# Patient Record
Sex: MALE | Race: BLACK OR AFRICAN AMERICAN | Employment: FULL TIME | URBAN - METROPOLITAN AREA
[De-identification: names, ages, dates, MRNs, and addresses within clinical notes are randomized per-mention and may not be internally consistent; named-entity substitution may affect disease eponyms.]

---

## 2018-06-06 ENCOUNTER — HOSPITAL ENCOUNTER (EMERGENCY)
Facility: CLINIC | Age: 58
Discharge: HOME | End: 2018-06-06
Attending: FAMILY MEDICINE | Admitting: FAMILY MEDICINE
Payer: COMMERCIAL

## 2018-06-06 ENCOUNTER — APPOINTMENT (OUTPATIENT)
Dept: GENERAL RADIOLOGY | Facility: CLINIC | Age: 58
End: 2018-06-06
Attending: FAMILY MEDICINE
Payer: COMMERCIAL

## 2018-06-06 VITALS
TEMPERATURE: 98.2 F | OXYGEN SATURATION: 100 % | SYSTOLIC BLOOD PRESSURE: 131 MMHG | HEART RATE: 51 BPM | DIASTOLIC BLOOD PRESSURE: 75 MMHG | RESPIRATION RATE: 13 BRPM

## 2018-06-06 DIAGNOSIS — R07.89 OTHER CHEST PAIN: ICD-10-CM

## 2018-06-06 DIAGNOSIS — E75.21: ICD-10-CM

## 2018-06-06 DIAGNOSIS — G89.29 CHRONIC CHEST PAIN: ICD-10-CM

## 2018-06-06 DIAGNOSIS — G89.29 OTHER CHRONIC PAIN: ICD-10-CM

## 2018-06-06 DIAGNOSIS — I42.2 HYPERTROPHIC CARDIOMYOPATHY (H): ICD-10-CM

## 2018-06-06 DIAGNOSIS — R07.9 CHRONIC CHEST PAIN: ICD-10-CM

## 2018-06-06 LAB
ALBUMIN SERPL-MCNC: 4 G/DL (ref 3.4–5)
ALP SERPL-CCNC: 73 U/L (ref 40–150)
ALT SERPL W P-5'-P-CCNC: 15 U/L (ref 0–70)
ANION GAP SERPL CALCULATED.3IONS-SCNC: 7 MMOL/L (ref 3–14)
AST SERPL W P-5'-P-CCNC: 25 U/L (ref 0–45)
BASOPHILS # BLD AUTO: 0.1 10E9/L (ref 0–0.2)
BASOPHILS NFR BLD AUTO: 0.7 %
BILIRUB SERPL-MCNC: 0.5 MG/DL (ref 0.2–1.3)
BUN SERPL-MCNC: 13 MG/DL (ref 7–30)
CALCIUM SERPL-MCNC: 10.1 MG/DL (ref 8.5–10.1)
CHLORIDE SERPL-SCNC: 107 MMOL/L (ref 94–109)
CO2 SERPL-SCNC: 31 MMOL/L (ref 20–32)
CREAT SERPL-MCNC: 0.88 MG/DL (ref 0.66–1.25)
D DIMER PPP FEU-MCNC: 0.4 UG/ML FEU (ref 0–0.5)
DIFFERENTIAL METHOD BLD: NORMAL
EOSINOPHIL # BLD AUTO: 0.2 10E9/L (ref 0–0.7)
EOSINOPHIL NFR BLD AUTO: 2.6 %
ERYTHROCYTE [DISTWIDTH] IN BLOOD BY AUTOMATED COUNT: 13.5 % (ref 10–15)
GFR SERPL CREATININE-BSD FRML MDRD: 88 ML/MIN/1.7M2
GLUCOSE SERPL-MCNC: 93 MG/DL (ref 70–99)
HCT VFR BLD AUTO: 44 % (ref 40–53)
HGB BLD-MCNC: 14.3 G/DL (ref 13.3–17.7)
IMM GRANULOCYTES # BLD: 0 10E9/L (ref 0–0.4)
IMM GRANULOCYTES NFR BLD: 0.1 %
LYMPHOCYTES # BLD AUTO: 1.6 10E9/L (ref 0.8–5.3)
LYMPHOCYTES NFR BLD AUTO: 22.3 %
MCH RBC QN AUTO: 28.7 PG (ref 26.5–33)
MCHC RBC AUTO-ENTMCNC: 32.5 G/DL (ref 31.5–36.5)
MCV RBC AUTO: 88 FL (ref 78–100)
MONOCYTES # BLD AUTO: 0.6 10E9/L (ref 0–1.3)
MONOCYTES NFR BLD AUTO: 8.7 %
NEUTROPHILS # BLD AUTO: 4.8 10E9/L (ref 1.6–8.3)
NEUTROPHILS NFR BLD AUTO: 65.6 %
NRBC # BLD AUTO: 0 10*3/UL
NRBC BLD AUTO-RTO: 0 /100
NT-PROBNP SERPL-MCNC: 731 PG/ML (ref 0–900)
PLATELET # BLD AUTO: 198 10E9/L (ref 150–450)
POTASSIUM SERPL-SCNC: 4.4 MMOL/L (ref 3.4–5.3)
PROT SERPL-MCNC: 7.5 G/DL (ref 6.8–8.8)
RBC # BLD AUTO: 4.98 10E12/L (ref 4.4–5.9)
SODIUM SERPL-SCNC: 145 MMOL/L (ref 133–144)
TROPONIN I BLD-MCNC: 0.07 UG/L (ref 0–0.1)
TROPONIN I SERPL-MCNC: 0.1 UG/L (ref 0–0.04)
WBC # BLD AUTO: 7.3 10E9/L (ref 4–11)

## 2018-06-06 PROCEDURE — 93010 ELECTROCARDIOGRAM REPORT: CPT | Mod: Z6 | Performed by: FAMILY MEDICINE

## 2018-06-06 PROCEDURE — 85025 COMPLETE CBC W/AUTO DIFF WBC: CPT | Performed by: FAMILY MEDICINE

## 2018-06-06 PROCEDURE — 85379 FIBRIN DEGRADATION QUANT: CPT | Performed by: FAMILY MEDICINE

## 2018-06-06 PROCEDURE — 83880 ASSAY OF NATRIURETIC PEPTIDE: CPT | Performed by: FAMILY MEDICINE

## 2018-06-06 PROCEDURE — 84484 ASSAY OF TROPONIN QUANT: CPT | Performed by: FAMILY MEDICINE

## 2018-06-06 PROCEDURE — 25000132 ZZH RX MED GY IP 250 OP 250 PS 637: Performed by: FAMILY MEDICINE

## 2018-06-06 PROCEDURE — 99285 EMERGENCY DEPT VISIT HI MDM: CPT

## 2018-06-06 PROCEDURE — 84484 ASSAY OF TROPONIN QUANT: CPT

## 2018-06-06 PROCEDURE — 96374 THER/PROPH/DIAG INJ IV PUSH: CPT

## 2018-06-06 PROCEDURE — 80053 COMPREHEN METABOLIC PANEL: CPT | Performed by: FAMILY MEDICINE

## 2018-06-06 PROCEDURE — 25000128 H RX IP 250 OP 636: Performed by: FAMILY MEDICINE

## 2018-06-06 PROCEDURE — 99285 EMERGENCY DEPT VISIT HI MDM: CPT | Mod: 25 | Performed by: FAMILY MEDICINE

## 2018-06-06 PROCEDURE — 71045 X-RAY EXAM CHEST 1 VIEW: CPT

## 2018-06-06 PROCEDURE — 93005 ELECTROCARDIOGRAM TRACING: CPT

## 2018-06-06 RX ORDER — ASPIRIN 81 MG/1
324 TABLET, CHEWABLE ORAL ONCE
Status: COMPLETED | OUTPATIENT
Start: 2018-06-06 | End: 2018-06-06

## 2018-06-06 RX ORDER — ONDANSETRON 2 MG/ML
4 INJECTION INTRAMUSCULAR; INTRAVENOUS EVERY 30 MIN PRN
Status: DISCONTINUED | OUTPATIENT
Start: 2018-06-06 | End: 2018-06-06 | Stop reason: HOSPADM

## 2018-06-06 RX ORDER — NITROGLYCERIN 0.4 MG/1
0.4 TABLET SUBLINGUAL EVERY 5 MIN PRN
Status: DISCONTINUED | OUTPATIENT
Start: 2018-06-06 | End: 2018-06-06 | Stop reason: HOSPADM

## 2018-06-06 RX ORDER — SODIUM CHLORIDE 9 MG/ML
INJECTION, SOLUTION INTRAVENOUS
Status: DISCONTINUED
Start: 2018-06-06 | End: 2018-06-06 | Stop reason: HOSPADM

## 2018-06-06 RX ADMIN — ASPIRIN 81 MG CHEWABLE TABLET 324 MG: 81 TABLET CHEWABLE at 13:39

## 2018-06-06 RX ADMIN — NITROGLYCERIN 0.4 MG: 0.4 TABLET SUBLINGUAL at 13:35

## 2018-06-06 RX ADMIN — ONDANSETRON 4 MG: 2 INJECTION INTRAMUSCULAR; INTRAVENOUS at 13:40

## 2018-06-06 NOTE — ED AVS SNAPSHOT
[facility], [place], Emergency Department    [address]    [place] [address]    Phone:  [phone]    Fax:  [phone]                                       Lewis Cortes   MRN: [id number]    Department:  [facility], Emergency Department   Date of Visit:  6/6/2018           After Visit Summary Signature Page     I have received my discharge instructions, and my questions have been answered. I have discussed any challenges I see with this plan with the nurse or doctor.    ..........................................................................................................................................  Patient/Patient Representative Signature      ..........................................................................................................................................  Patient Representative Print Name and Relationship to Patient    ..................................................               ................................................  Date                                            Time    ..........................................................................................................................................  Reviewed by Signature/Title    ...................................................              ..............................................  Date                                                            Time

## 2018-06-06 NOTE — ED AVS SNAPSHOT
[facility], Emergency Department    [address]    [place] [address]    Phone:  [phone]    Fax:  [phone]                                       Lewis Cortes   MRN: [id number]    Department:  [facility], Emergency Department   Date of Visit:  6/6/2018           Patient Information     Date Of Birth          1960        Your diagnoses for this visit were:     Hypertrophic cardiomyopathy (H)     Donald-Fabry disease (H)     Chronic chest pain        You were seen by Jhonny Sinha MD.        Discharge Instructions       Thank you for choosing [facility].     Please closely monitor for further symptoms. Return to the Emergency Department if you develop any new or worsening signs or symptoms.    If you received any opiate pain medications or sedatives during your visit, please do not drive for at least 8 hours.     Labs, cultures or final xray interpretations may still need to be reviewed.  We will call you if your plan of care needs to be changed.    Please follow up with your primary care physician and your regular cardiologist.  A referral was made to the [facility] cardiology clinic as per your request.  It is very important to take your medications which have been prescribed to you by your cardiologist on a regular basis.      Hypertrophic Cardiomyopathy    Hypertrophic cardiomyopathy is a condition that affects the way the heart muscle works. It causes the heart muscle to grow thicker and stiffer than normal in certain areas, especially in the walls of the left ventricle and septum. This makes it hard for the heart to pump blood properly. Hypertrophic cardiomyopathy is most often caused by a genetic disorder, although it often doesn't show up until later in life. It can also develop due to aging or changes from chronic high blood pressure.  Hypertrophic cardiomyopathy may raise your risk for heart failure. Over time, the heart muscle  may become too stiff to let the ventricle fill completely. Then the heart can't pump enough blood to meet the body's need. This can cause symptoms such as breathlessness, tiredness, or exhaustion when trying to exercise. Let your healthcare provider know if you have these symptoms.  In some people, hypertrophic cardiomyopathy carries a risk for sudden cardiac death. If you have any passing out spells, tell your healthcare provider about them right away. If anyone in your family has  suddenly, especially at an unexpected age, tell your healthcare provider.  The condition can be managed, but there is no cure. Talk to your healthcare provider about treatment.  What are the symptoms of hypertrophic cardiomyopathy?  You may have no symptoms with hypertrophic cardiomyopathy. If symptoms do occur, they most likely appear when you exert yourself. Symptoms may include:    Problems catching your breath    Unexplained tiredness    Lightheadedness, dizzy spells, or fainting    Rapid, pounding heartbeat    Chest tightness or pressure    Fluid retention resulting in swollen feet or ankles or unexplained weight gain  What happens in your heart?  As the walls of the heart muscle thicken, it becomes harder for the heart to hold as much blood as possible. Thick walls may also block blood flow out to the rest of the body and damage heart valves. A stiff heart muscle can t relax between pumps the way it should, so less blood moves with each pump. Also, the heart may sometimes beat irregularly (too fast and out of rhythm).  Your treatment plan  Treatment can help keep cardiomyopathy from getting worse, and can reduce your symptoms. Your healthcare provider will work with you to develop a treatment plan to help you feel better now and prevent problems in the future. It is very important to follow the treatment plan exactly as directed. If you have questions or problems, talk to your healthcare provider.  Date Last Reviewed:  1/1/2017 2000-2017 [employer]. [address] [place] [address], [place] [address]. All rights reserved. This information is not intended as a substitute for professional medical care. Always follow your healthcare professional's instructions.          Discharge Instructions for Cardiomyopathy  Cardiomyopathy means that your heart is not working as it normally should. This condition can make it more difficult to do things that may have been easy for you in the past. But with proper treatment and some lifestyle changes, you and your healthcare provider can help your heart do its job.  Home care  Work hard to remove the salt from your diet. Here are tips:    Limit canned, dried, packaged, and fast foods.    Don t add salt to your food at the table.    Season foods with herbs instead of salt when you cook.    When you eat out, ask that the  not add any salt to your dish.    Don't eat fried or greasy foods.    Be careful of bottled beverages. They can contain a lot of salt.  Also check the labels of over-the-counter medicines and supplements. They may be high in sodium. Ask your pharmacist or provider if you need help finding a low-salt product.  Be as active as you can. Ask your healthcare provider how to get started:    Simple activities such as walking or gardening can help.    Find activities you enjoy and make them a priority.    Cardiac rehabilitation programs can help you reach your activity goals. You exercise while staff closely watches the stress on your heart. These programs may be covered by insurance.  Other tips for home care:    Limit how much fluid you have each day. Your healthcare provider will tell you how much is safe.    Break the smoking habit. Enroll in a stop-smoking program to improve your chances of success. Join smoking cessation support groups or ask your healthcare provider about nicotine replacement products.    Take your medicines exactly as directed. Don t skip doses. Don t  stop taking your medicines without talking to your healthcare provider first.    Some over-the-counter medicines and herbal supplements can increase your heart rate or blood pressure. This can put extra stress on your heart. Check with your pharmacist to see if products are heart-safe and won't interact with other medicines you take.    Visit your healthcare provider regularly. Mention any problems with your treatment plan. Together you can find a plan that works for you.    Weigh yourself at the same time each day. The best time is in the morning after you wake up and after urinating. Wear the same clothing each time. Keep a written record of your daily weight.    Limit how much alcohol you drink. Too much alcohol isn't good for the heart. Healthcare providers advise no more than 1 drink per day for women and 2 drinks per day for men.  Follow-up care  Make a follow-up appointment as directed by our staff.     When to call your healthcare provider  Call your healthcare provider right away if you have any of the following:    You gain more than 2 pounds in 1 day, more than 5 pounds in 1 week, or whatever weight gain you were told to report by your healthcare provider    New or increased chest pain that doesn't get better with medicine    New or increased shortness of breath or coughing    Weakness in the muscles of your face, arms, or legs    Trouble speaking    Rapid pulse or pounding heartbeat    Fainting, or feeling dizzy or lightheaded    New or increased swelling in your hands, feet, or ankles   Date Last Reviewed: 2/1/2017 2000-2017 [employer]. [address] [place] [address], [place] [address]. All rights reserved. This information is not intended as a substitute for professional medical care. Always follow your healthcare professional's instructions.          24 Hour Appointment Hotline       To make an appointment at any [facility], call [phone] ([phone]). If you don't have a  family doctor or clinic, we will help you find one. [facility] are conveniently located to serve the needs of you and your family.          ED Discharge Orders     CARDIOLOGY EVAL ADULT REFERRAL       Preferred location:  [facility] [phone]   [email or web address]    Please be aware that coverage of these services is subject to the terms and limitations of your health insurance plan.  Call member services at your health plan with any benefit or coverage questions.      Please bring the following to your appointment:  Any x-rays, CTs or MRIs which have been performed. Contact the facility where they were done to arrange for  prior to your scheduled appointment.    List of current medications  This referral request   Any documents/labs given to you for this referral                     Review of your medicines      START taking        Dose / Directions Last dose taken    diclofenac 1 % Gel topical gel   Commonly known as:  VOLTAREN   Quantity:  100 g        Apply 2-4 grams to area up to four times daily using enclosed dosing card.   Refills:  1                Prescriptions were sent or printed at these locations (1 Prescription)                   Other Prescriptions                Printed at Department/Unit printer (1 of 1)         diclofenac (VOLTAREN) 1 % GEL topical gel                Procedures and tests performed during your visit     CBC with platelets differential    Cardiac Continuous Monitoring    Comprehensive metabolic panel    D dimer quantitative    EKG 12 lead    ISTAT troponin nursing POCT    Nt probnp inpatient (BNP)    Peripheral IV: Standard    Pulse oximetry nursing    Review medications with patient    Troponin I    Troponin POCT    XR Chest Port 1 View      Orders Needing Specimen Collection     None      Pending Results     No orders found from 6/4/2018 to 6/7/2018.            Pending Culture  "Results     No orders found from 2018 to 2018.            Pending Results Instructions     If you had any lab results that were not finalized at the time of your Discharge, you can call the ED Lab Result RN at [phone]. You will be contacted by this team for any positive Lab results or changes in treatment. The nurses are available 7 days a week from 10A to 6:30P.  You can leave a message 24 hours per day and they will return your call.        Thank you for choosing [facility]       Thank you for choosing [facility] for your care. Our goal is always to provide you with excellent care. Hearing back from our patients is one way we can continue to improve our services. Please take a few minutes to complete the written survey that you may receive in the mail after you visit with us. Thank you!        [facility] Information     [employer] lets you send messages to your doctor, view your test results, renew your prescriptions, schedule appointments and more. To sign up, go to [email or web address] . Click on \"Log in\" on the left side of the screen, which will take you to the Welcome page. Then click on \"Sign up Now\" on the right side of the page.     You will be asked to enter the access code listed below, as well as some personal information. Please follow the directions to create your username and password.     Your access code is: [email or web address]  Expires: 2018  3:19 PM     Your access code will  in 90 days. If you need help or a new code, please call your [facility] clinic or [phone].        Care EveryWhere ID     This is your Care EveryWhere ID. This could be used by other organizations to access your [facility] medical records  [id number]        Equal Access to Services     [facility] : Hadester Menchaca, wang toledo, kirstin carver. So [facility] [phone].    ATENCIÓN: Si habla español, tiene a wren disposición servicios gratuitos de " asistencia lingüística. Ranulfo al [phone].    We comply with applicable federal civil rights laws and [place] laws. We do not discriminate on the basis of race, color, national origin, age, disability, sex, sexual orientation, or gender identity.            After Visit Summary       This is your record. Keep this with you and show to your community pharmacist(s) and doctor(s) at your next visit.

## 2018-06-06 NOTE — ED PROVIDER NOTES
"  History     Chief Complaint   Patient presents with     Chest Pain     HPI  Lewis Cortes is a 57 year old male with a history of COPD, anxiety, chronic idiopathic myopericarditis, chronically elevated troponin, meaghan-fabry disease, noncompliance with medications, and drug-seeking behavior who presents to the Emergency Department for constant chest pain that started at 10am today. The patient reports that his cat jumped on him and told him to come to the ED for his left sided chest pain. He reports this as left electrical tingling that goes down his left arm and left side of his neck and between his shoulder blades. He reports that he is short of breath. He reports that he experienced light headedness and dizziness prior to onset of chest pain. The patient denies any leg swelling or pain, but reports that the top of his right foot is \"stinging\". He denies any triggers. He denies history of cancer, or diabetes. He reports that both his parents had CHF as age progressed. He reports that he only sees his PCP when something doesn't feel right. He reports having a hard time thinking. He presents here with his wife.     The patient reports that he can't remember a pain like this before and denies that he has any medical problems but Epic reports otherwise. He also denies alcohol, drug, tobacco or marijuana use. It is notable on Epic that the patient has had multiple visits to other Emergency Departments for the same symptoms. He has had many xray's, cardiac event monitoring, stress imaging, ECHO and angiograms in the past, the most recent imaging and testing results are presented below:    Xray chest 2 views  PA and lateral, 5/9/18:  Findings: Emphysematous changes and features suggestive of COPD redemonstrated. No pneumothorax or pleural effusion. No confluent airspace opacity. Normal heart size and pulmonary vascularity.  Cardiac event monitoring, 4/2/18:  OVERALL IMPRESSION:  No sustained arrhythmias were " present.  The patient reported passing out on March 17th.  This correlated with sinus rhythm without pauses or any other arrhythmias.  MR cardiac stress imagining WWO-dual read, 3/15/18:   Impression:  1. Review of the noncardiac structures included on the exam and upper   abdominal structures demonstrate no significant pathology.  2. Cardiac findings will be reported by cardiology.  ECHO complete WO contrast, 2/26/18:  Final Conclusion   1. Moderate to severe left ventricular hypertrophy without obvious regional wall motion   abnormalities; EF ~ 65%.   2. Stage II diastolic dysfunction.   3. Moderate right ventricular hypertrophy with low normal right ventricular systolic function.   4. Mild left atrial dilation.   5. Eccentric aortic valve regurgitation (likely mild aortic regurgitation).   6. The aortic root measures 4.8cm; the ascending aorta is moderately dilated at 4.5 cm   Estimated EF: 65-70%  CVL Coronary Angiogram 2/16/18  Summary:    No evidence to suggest coronary artery disease as the etiology of his elevated troponin.    I have reviewed the Medications, Allergies, Past Medical and Surgical History, and Social History in the [employer] system.  History reviewed. No pertinent past medical history.    History reviewed. No pertinent surgical history.    No family history on file.    Social History   Substance Use Topics     Smoking status: Never Smoker     Smokeless tobacco: Never Used     Alcohol use No       Current Facility-Administered Medications   Medication     nitroGLYcerin (NITROSTAT) sublingual tablet 0.4 mg     ondansetron (ZOFRAN) injection 4 mg     sodium chloride 0.9 % infusion     Current Outpatient Prescriptions   Medication     diclofenac (VOLTAREN) 1 % GEL topical gel      No Known Allergies    Review of Systems  ROS: 14 point ROS neg other than the symptoms noted above in the HPI.    Physical Exam   BP: (!) 153/114  Pulse: 58  Temp: 98.2  F (36.8  C)  Resp: 18  SpO2: 96 %      Physical Exam    Constitutional: He is oriented to person, place, and time. He appears well-developed and well-nourished.   HENT:   Head: Normocephalic and atraumatic.   Mouth/Throat: Oropharynx is clear and moist.   Eyes: EOM are normal. Pupils are equal, round, and reactive to light.   Neck: Normal range of motion. Neck supple. No tracheal deviation present. No thyromegaly present.   Cardiovascular: Normal rate, regular rhythm, normal heart sounds and intact distal pulses.  Exam reveals no gallop and no friction rub.    No murmur heard.  Pulmonary/Chest: Effort normal and breath sounds normal. He exhibits no tenderness.   Abdominal: Soft. Bowel sounds are normal. He exhibits no distension and no mass. There is no tenderness.   Musculoskeletal: He exhibits no edema or tenderness.   Neurological: He is alert and oriented to person, place, and time. No cranial nerve deficit. Coordination normal.   Skin: Skin is warm and dry. No rash noted.   Psychiatric: He has a normal mood and affect. His behavior is normal.   Nursing note and vitals reviewed.      ED Course     ED Course     Procedures             EKG Interpretation:      Interpreted by Jhonny Sinha  Time reviewed: 1331  Symptoms at time of EKG: chest pain   Rhythm: normal sinus   Rate: Normal  Axis: Normal  Ectopy: none  Conduction: RSR' without QRS prolongation in V1  ST Segments/ T Waves: T wave inversion V4, V5, V6 and Inferior  Q Waves: nonspecific  Comparison to prior: Unchanged from multiple previous EKGs including most recently April and May 2018    Clinical Impression: no acute changes                Critical Care time:  none             Labs Ordered and Resulted from Time of ED Arrival Up to the Time of Departure from the ED   TROPONIN I - Abnormal; Notable for the following:        Result Value    Troponin I ES 0.097 (*)     All other components within normal limits   COMPREHENSIVE METABOLIC PANEL - Abnormal; Notable for the following:     Sodium 145 (*)     All  "other components within normal limits   CBC WITH PLATELETS DIFFERENTIAL   D DIMER QUANTITATIVE   NT PROBNP INPATIENT   PULSE OXIMETRY NURSING   CARDIAC CONTINUOUS MONITORING   PERIPHERAL IV CATHETER   PATIENT CARE ORDER   ISTAT TROPONIN NURSING POCT   TROPONIN POCT            Assessments & Plan (with Medical Decision Making)   Patient with an extensive medical and cardiac history as as outlined above, including hypertrophic cardiomyopathy, Donald Fabry disease, chronic recurrent chest pain with elevated troponins, multiple negative coronary angiograms now presenting with reported acute left chest pain.   Initial differential diagnosis included a life-threatening cause of chest pain such as a ACS, PE, dissection, pneumonia, pneumothorax, pericarditis ,Boerhaave tear, as well as recurrent exacerbation of his chronic chest pain syndrome.  On exam he was initially hypertensive and appeared uncomfortable.  He was reporting 7 out of 10 chest pain.  His vital signs are otherwise unremarkable.  His lungs are clear to auscultation his chest wall nontender.  His cardiovascular exam including pulses was normal.  Benign abdominal exam   his EKG initially with the findings outlined above, and at that time the patient denying any prior cardiac history.  We initiated a cardiac workup placed him on a monitor.  Istat troponin 0 0.07.  Chest x-ray negative.  Pain resolved with a single nitroglycerin.  Blood pressure improved currently 128/73.  Well's criteria low probability, d-dimer negative.  I accessed care everywhere and learned much of the information outlined above and when I questioned the patient he stated,\" I hardly remember any of that\".  He later admitted to me that he has been frustrated that no one can figure out why he has this chronic chest pain and that he does not feel it has been manageable at home at times.  He does take his metoprolol, but admits he does not always take his long-acting nitrate because it causes " headaches.  At this time, this appears to be an exacerbation of his chronic condition.  I see no indication for hospital admission.  Patient is requesting referral to the [facility] cardiology neck, which does seem reasonable given the complexity of his underlying condition.  I strongly encouraged him to take his regular medications prescribed by his current cardiologist at least until that visit and I made a referral.  I also prescribed topical NSAIDs for his chronic pain.  At this time the patient appears stable to be discharged and can follow-up on an outpatient basis with regards to his chronic chest pain and complex underlying condition.  Discussed expected course, need for follow up, and indications for return with the patient.  See discharge instructions.      I have reviewed the nursing notes.    I have reviewed the findings, diagnosis, plan and need for follow up with the patient.    New Prescriptions    DICLOFENAC (VOLTAREN) 1 % GEL TOPICAL GEL    Apply 2-4 grams to area up to four times daily using enclosed dosing card.       Final diagnoses:   Hypertrophic cardiomyopathy (H)   Donald-Fabry disease (H)   Chronic chest pain     I, Chelly Stover, am serving as a trained medical scribe to document services personally performed by Jhonny Sinha MD, based on the provider's statements to me.   IJhonny MD, was physically present and have reviewed and verified the accuracy of this note documented by Chelly Stover.    6/6/2018   [facility], [place], EMERGENCY DEPARTMENT     Jhonny Sinha MD  06/06/18 3893

## 2018-06-06 NOTE — DISCHARGE INSTRUCTIONS
Thank you for choosing [facility].     Please closely monitor for further symptoms. Return to the Emergency Department if you develop any new or worsening signs or symptoms.    If you received any opiate pain medications or sedatives during your visit, please do not drive for at least 8 hours.     Labs, cultures or final xray interpretations may still need to be reviewed.  We will call you if your plan of care needs to be changed.    Please follow up with your primary care physician and your regular cardiologist.  A referral was made to the [facility] cardiology clinic as per your request.  It is very important to take your medications which have been prescribed to you by your cardiologist on a regular basis.      Hypertrophic Cardiomyopathy    Hypertrophic cardiomyopathy is a condition that affects the way the heart muscle works. It causes the heart muscle to grow thicker and stiffer than normal in certain areas, especially in the walls of the left ventricle and septum. This makes it hard for the heart to pump blood properly. Hypertrophic cardiomyopathy is most often caused by a genetic disorder, although it often doesn't show up until later in life. It can also develop due to aging or changes from chronic high blood pressure.  Hypertrophic cardiomyopathy may raise your risk for heart failure. Over time, the heart muscle may become too stiff to let the ventricle fill completely. Then the heart can't pump enough blood to meet the body's need. This can cause symptoms such as breathlessness, tiredness, or exhaustion when trying to exercise. Let your healthcare provider know if you have these symptoms.  In some people, hypertrophic cardiomyopathy carries a risk for sudden cardiac death. If you have any passing out spells, tell your healthcare provider about them right away. If anyone in your family has  suddenly, especially at an unexpected age, tell your healthcare  provider.  The condition can be managed, but there is no cure. Talk to your healthcare provider about treatment.  What are the symptoms of hypertrophic cardiomyopathy?  You may have no symptoms with hypertrophic cardiomyopathy. If symptoms do occur, they most likely appear when you exert yourself. Symptoms may include:    Problems catching your breath    Unexplained tiredness    Lightheadedness, dizzy spells, or fainting    Rapid, pounding heartbeat    Chest tightness or pressure    Fluid retention resulting in swollen feet or ankles or unexplained weight gain  What happens in your heart?  As the walls of the heart muscle thicken, it becomes harder for the heart to hold as much blood as possible. Thick walls may also block blood flow out to the rest of the body and damage heart valves. A stiff heart muscle can t relax between pumps the way it should, so less blood moves with each pump. Also, the heart may sometimes beat irregularly (too fast and out of rhythm).  Your treatment plan  Treatment can help keep cardiomyopathy from getting worse, and can reduce your symptoms. Your healthcare provider will work with you to develop a treatment plan to help you feel better now and prevent problems in the future. It is very important to follow the treatment plan exactly as directed. If you have questions or problems, talk to your healthcare provider.  Date Last Reviewed: 1/1/2017 2000-2017 [employer]. [address] [place] [address], [place] [address]. All rights reserved. This information is not intended as a substitute for professional medical care. Always follow your healthcare professional's instructions.          Discharge Instructions for Cardiomyopathy  Cardiomyopathy means that your heart is not working as it normally should. This condition can make it more difficult to do things that may have been easy for you in the past. But with proper treatment and some lifestyle changes, you and your healthcare provider  can help your heart do its job.  Home care  Work hard to remove the salt from your diet. Here are tips:    Limit canned, dried, packaged, and fast foods.    Don t add salt to your food at the table.    Season foods with herbs instead of salt when you cook.    When you eat out, ask that the  not add any salt to your dish.    Don't eat fried or greasy foods.    Be careful of bottled beverages. They can contain a lot of salt.  Also check the labels of over-the-counter medicines and supplements. They may be high in sodium. Ask your pharmacist or provider if you need help finding a low-salt product.  Be as active as you can. Ask your healthcare provider how to get started:    Simple activities such as walking or gardening can help.    Find activities you enjoy and make them a priority.    Cardiac rehabilitation programs can help you reach your activity goals. You exercise while staff closely watches the stress on your heart. These programs may be covered by insurance.  Other tips for home care:    Limit how much fluid you have each day. Your healthcare provider will tell you how much is safe.    Break the smoking habit. Enroll in a stop-smoking program to improve your chances of success. Join smoking cessation support groups or ask your healthcare provider about nicotine replacement products.    Take your medicines exactly as directed. Don t skip doses. Don t stop taking your medicines without talking to your healthcare provider first.    Some over-the-counter medicines and herbal supplements can increase your heart rate or blood pressure. This can put extra stress on your heart. Check with your pharmacist to see if products are heart-safe and won't interact with other medicines you take.    Visit your healthcare provider regularly. Mention any problems with your treatment plan. Together you can find a plan that works for you.    Weigh yourself at the same time each day. The best time is in the morning after you wake  up and after urinating. Wear the same clothing each time. Keep a written record of your daily weight.    Limit how much alcohol you drink. Too much alcohol isn't good for the heart. Healthcare providers advise no more than 1 drink per day for women and 2 drinks per day for men.  Follow-up care  Make a follow-up appointment as directed by our staff.     When to call your healthcare provider  Call your healthcare provider right away if you have any of the following:    You gain more than 2 pounds in 1 day, more than 5 pounds in 1 week, or whatever weight gain you were told to report by your healthcare provider    New or increased chest pain that doesn't get better with medicine    New or increased shortness of breath or coughing    Weakness in the muscles of your face, arms, or legs    Trouble speaking    Rapid pulse or pounding heartbeat    Fainting, or feeling dizzy or lightheaded    New or increased swelling in your hands, feet, or ankles   Date Last Reviewed: 2/1/2017 2000-2017 [employer]. [address] [place] [address], [place] [address]. All rights reserved. This information is not intended as a substitute for professional medical care. Always follow your healthcare professional's instructions.

## 2018-06-07 LAB — INTERPRETATION ECG - MUSE: NORMAL

## 2018-07-28 ENCOUNTER — HOSPITAL ENCOUNTER (OUTPATIENT)
Facility: CLINIC | Age: 58
Setting detail: OBSERVATION
Discharge: HOME | End: 2018-07-29
Attending: EMERGENCY MEDICINE | Admitting: INTERNAL MEDICINE
Payer: COMMERCIAL

## 2018-07-28 ENCOUNTER — APPOINTMENT (OUTPATIENT)
Dept: CT IMAGING | Facility: CLINIC | Age: 58
End: 2018-07-28
Attending: EMERGENCY MEDICINE
Payer: COMMERCIAL

## 2018-07-28 ENCOUNTER — APPOINTMENT (OUTPATIENT)
Dept: GENERAL RADIOLOGY | Facility: CLINIC | Age: 58
End: 2018-07-28
Attending: EMERGENCY MEDICINE
Payer: COMMERCIAL

## 2018-07-28 DIAGNOSIS — I51.4 MYOCARDITIS, UNSPECIFIED CHRONICITY, UNSPECIFIED MYOCARDITIS TYPE (H): ICD-10-CM

## 2018-07-28 DIAGNOSIS — R79.89 ELEVATED TROPONIN I LEVEL: ICD-10-CM

## 2018-07-28 DIAGNOSIS — J43.8 OTHER EMPHYSEMA (H): ICD-10-CM

## 2018-07-28 DIAGNOSIS — R07.89 OTHER CHEST PAIN: ICD-10-CM

## 2018-07-28 DIAGNOSIS — I45.10 RIGHT BUNDLE BRANCH BLOCK: ICD-10-CM

## 2018-07-28 DIAGNOSIS — E75.21: ICD-10-CM

## 2018-07-28 DIAGNOSIS — I31.9 PERICARDITIS, UNSPECIFIED CHRONICITY, UNSPECIFIED TYPE: Primary | ICD-10-CM

## 2018-07-28 LAB
ALBUMIN SERPL-MCNC: 3.9 G/DL (ref 3.4–5)
ALP SERPL-CCNC: 62 U/L (ref 40–150)
ALT SERPL W P-5'-P-CCNC: 20 U/L (ref 0–70)
ANION GAP SERPL CALCULATED.3IONS-SCNC: 8 MMOL/L (ref 3–14)
AST SERPL W P-5'-P-CCNC: 32 U/L (ref 0–45)
BASOPHILS # BLD AUTO: 0.1 10E9/L (ref 0–0.2)
BASOPHILS NFR BLD AUTO: 1 %
BILIRUB SERPL-MCNC: 0.6 MG/DL (ref 0.2–1.3)
BUN SERPL-MCNC: 17 MG/DL (ref 7–30)
CALCIUM SERPL-MCNC: 9.8 MG/DL (ref 8.5–10.1)
CHLORIDE SERPL-SCNC: 105 MMOL/L (ref 94–109)
CO2 SERPL-SCNC: 28 MMOL/L (ref 20–32)
CREAT SERPL-MCNC: 0.99 MG/DL (ref 0.66–1.25)
DIFFERENTIAL METHOD BLD: ABNORMAL
EOSINOPHIL # BLD AUTO: 0.3 10E9/L (ref 0–0.7)
EOSINOPHIL NFR BLD AUTO: 4.5 %
ERYTHROCYTE [DISTWIDTH] IN BLOOD BY AUTOMATED COUNT: 12.9 % (ref 10–15)
GFR SERPL CREATININE-BSD FRML MDRD: 77 ML/MIN/1.7M2
GLUCOSE SERPL-MCNC: 110 MG/DL (ref 70–99)
HCT VFR BLD AUTO: 39.2 % (ref 40–53)
HGB BLD-MCNC: 12.7 G/DL (ref 13.3–17.7)
IMM GRANULOCYTES # BLD: 0 10E9/L (ref 0–0.4)
IMM GRANULOCYTES NFR BLD: 0 %
INR PPP: 1.01 (ref 0.86–1.14)
LIPASE SERPL-CCNC: 106 U/L (ref 73–393)
LYMPHOCYTES # BLD AUTO: 1.6 10E9/L (ref 0.8–5.3)
LYMPHOCYTES NFR BLD AUTO: 27.6 %
MCH RBC QN AUTO: 28.5 PG (ref 26.5–33)
MCHC RBC AUTO-ENTMCNC: 32.4 G/DL (ref 31.5–36.5)
MCV RBC AUTO: 88 FL (ref 78–100)
MONOCYTES # BLD AUTO: 0.4 10E9/L (ref 0–1.3)
MONOCYTES NFR BLD AUTO: 7.1 %
NEUTROPHILS # BLD AUTO: 3.6 10E9/L (ref 1.6–8.3)
NEUTROPHILS NFR BLD AUTO: 59.8 %
NRBC # BLD AUTO: 0 10*3/UL
NRBC BLD AUTO-RTO: 0 /100
PLATELET # BLD AUTO: 150 10E9/L (ref 150–450)
POTASSIUM SERPL-SCNC: 3.7 MMOL/L (ref 3.4–5.3)
PROT SERPL-MCNC: 6.9 G/DL (ref 6.8–8.8)
RBC # BLD AUTO: 4.45 10E12/L (ref 4.4–5.9)
SODIUM SERPL-SCNC: 142 MMOL/L (ref 133–144)
TROPONIN I SERPL-MCNC: 0.14 UG/L (ref 0–0.04)
WBC # BLD AUTO: 6 10E9/L (ref 4–11)

## 2018-07-28 PROCEDURE — 93005 ELECTROCARDIOGRAM TRACING: CPT

## 2018-07-28 PROCEDURE — 85610 PROTHROMBIN TIME: CPT | Performed by: EMERGENCY MEDICINE

## 2018-07-28 PROCEDURE — 80053 COMPREHEN METABOLIC PANEL: CPT | Performed by: EMERGENCY MEDICINE

## 2018-07-28 PROCEDURE — 25000128 H RX IP 250 OP 636: Performed by: EMERGENCY MEDICINE

## 2018-07-28 PROCEDURE — 83690 ASSAY OF LIPASE: CPT | Performed by: EMERGENCY MEDICINE

## 2018-07-28 PROCEDURE — 96361 HYDRATE IV INFUSION ADD-ON: CPT | Mod: 59

## 2018-07-28 PROCEDURE — 85025 COMPLETE CBC W/AUTO DIFF WBC: CPT | Performed by: EMERGENCY MEDICINE

## 2018-07-28 PROCEDURE — 99285 EMERGENCY DEPT VISIT HI MDM: CPT | Mod: 25 | Performed by: EMERGENCY MEDICINE

## 2018-07-28 PROCEDURE — 93010 ELECTROCARDIOGRAM REPORT: CPT | Mod: Z6 | Performed by: EMERGENCY MEDICINE

## 2018-07-28 PROCEDURE — 96374 THER/PROPH/DIAG INJ IV PUSH: CPT

## 2018-07-28 PROCEDURE — 71260 CT THORAX DX C+: CPT

## 2018-07-28 PROCEDURE — 71045 X-RAY EXAM CHEST 1 VIEW: CPT

## 2018-07-28 PROCEDURE — 84484 ASSAY OF TROPONIN QUANT: CPT | Performed by: EMERGENCY MEDICINE

## 2018-07-28 PROCEDURE — 99285 EMERGENCY DEPT VISIT HI MDM: CPT | Mod: 25

## 2018-07-28 PROCEDURE — 96375 TX/PRO/DX INJ NEW DRUG ADDON: CPT

## 2018-07-28 PROCEDURE — 25000132 ZZH RX MED GY IP 250 OP 250 PS 637: Performed by: EMERGENCY MEDICINE

## 2018-07-28 RX ORDER — ASPIRIN 325 MG
325 TABLET ORAL ONCE
Status: COMPLETED | OUTPATIENT
Start: 2018-07-28 | End: 2018-07-28

## 2018-07-28 RX ORDER — ONDANSETRON 2 MG/ML
4 INJECTION INTRAMUSCULAR; INTRAVENOUS ONCE
Status: COMPLETED | OUTPATIENT
Start: 2018-07-28 | End: 2018-07-28

## 2018-07-28 RX ORDER — ASPIRIN 81 MG/1
81 TABLET, CHEWABLE ORAL
COMMUNITY

## 2018-07-28 RX ORDER — IOPAMIDOL 755 MG/ML
52 INJECTION, SOLUTION INTRAVASCULAR ONCE
Status: COMPLETED | OUTPATIENT
Start: 2018-07-28 | End: 2018-07-28

## 2018-07-28 RX ORDER — SODIUM CHLORIDE 9 MG/ML
1000 INJECTION, SOLUTION INTRAVENOUS CONTINUOUS
Status: DISCONTINUED | OUTPATIENT
Start: 2018-07-28 | End: 2018-07-29

## 2018-07-28 RX ORDER — ISOSORBIDE MONONITRATE 30 MG/1
15 TABLET, EXTENDED RELEASE ORAL
COMMUNITY
Start: 2018-02-27

## 2018-07-28 RX ORDER — MORPHINE SULFATE 4 MG/ML
4 INJECTION, SOLUTION INTRAMUSCULAR; INTRAVENOUS ONCE
Status: COMPLETED | OUTPATIENT
Start: 2018-07-28 | End: 2018-07-28

## 2018-07-28 RX ADMIN — ONDANSETRON 4 MG: 2 INJECTION INTRAMUSCULAR; INTRAVENOUS at 20:24

## 2018-07-28 RX ADMIN — ASPIRIN 325 MG ORAL TABLET 325 MG: 325 PILL ORAL at 20:32

## 2018-07-28 RX ADMIN — IOPAMIDOL 52 ML: 755 INJECTION, SOLUTION INTRAVENOUS at 21:58

## 2018-07-28 RX ADMIN — MORPHINE SULFATE 4 MG: 4 INJECTION INTRAVENOUS at 20:27

## 2018-07-28 RX ADMIN — SODIUM CHLORIDE 1000 ML: 9 INJECTION, SOLUTION INTRAVENOUS at 20:24

## 2018-07-28 ASSESSMENT — ENCOUNTER SYMPTOMS
SHORTNESS OF BREATH: 0
ABDOMINAL PAIN: 0
FEVER: 0

## 2018-07-28 NOTE — IP AVS SNAPSHOT
MRN:[id number]                      After Visit Summary   7/28/2018    Lewis Cortes    MRN: [id number]           Thank you!     Thank you for choosing [facility] for your care. Our goal is always to provide you with excellent care. Hearing back from our patients is one way we can continue to improve our services. Please take a few minutes to complete the written survey that you may receive in the mail after you visit with us. Thank you!        Patient Information     Date Of Birth          1960        About your hospital stay     You were admitted on:  July 29, 2018 You last received care in the:  [address] [facility]    You were discharged on:  July 29, 2018        Reason for your hospital stay       Chest Pain suspect Pericarditis. Seen by Cardiology. Recommended Ibuprofen 800 3 times daily x 1 week and Colchicine 0.6 mg twice daily x 3 months.   Follow up with Cardiology as outpatient.                  Who to Call     For medical emergencies, please call 911.  For non-urgent questions about your medical care, please call your primary care provider or clinic, [phone]          Attending Provider     Provider Specialty    Shaan Valdovinos MD Emergency Medicine    Joey, Jackson Danielle MD Internal Medicine       Primary Care Provider Office Phone # Fax #    [facility] [phone]      After Care Instructions     Activity       Your activity upon discharge: activity as tolerated            Diet       Follow this diet upon discharge: resume prior to admission diet.                  Follow-up Appointments     Adult [facility] Follow-up and recommended labs and tests       Follow up with Cardiology team  (Dr Angeline Rosen) . Preferably within 2 weeks.     Follow up with your primary care in 1 week or as needed.     Appointments on [place] and/or [facility] (with [facility] or [facility] provider or service). Call [phone] if you haven't heard  "regarding these appointments within 7 days of discharge.                  Pending Results     No orders found for last 3 day(s).            Statement of Approval     Ordered          18 1710  I have reviewed and agree with all the recommendations and orders detailed in this document.  EFFECTIVE NOW     Approved and electronically signed by:  Leighton Scott MD             Admission Information     Date & Time Provider Department Dept. Phone    2018 Jackson Cook MD [address] [facility] [phone]      Your Vitals Were     Blood Pressure Pulse Temperature Respirations Weight Pulse Oximetry    97/54 (BP Location: Left arm) 83 97.9  F (36.6  C) (Oral) 16 59.7 kg (131 lb 9.8 oz) 100%      MyChart Information     [email or web address] lets you send messages to your doctor, view your test results, renew your prescriptions, schedule appointments and more. To sign up, go to [email or web address] . Click on \"Log in\" on the left side of the screen, which will take you to the Welcome page. Then click on \"Sign up Now\" on the right side of the page.     You will be asked to enter the access code listed below, as well as some personal information. Please follow the directions to create your username and password.     Your access code is: [email or web address]  Expires: 2018  3:19 PM     Your access code will  in 90 days. If you need help or a new code, please call your [facility] clinic or [phone].        Care EveryWhere ID     This is your Care EveryWhere ID. This could be used by other organizations to access your [facility] medical records  [id number]        Equal Access to Services     [facility]: Hadii rosa rubalcava Sojonnathan, waaxda luqadaha, qaybta kaalmada catarina, kirstin lorenzo. So [facility] [phone].    ATENCIÓN: Si habla español, tiene a wren disposición servicios gratuitos de asistencia lingüística. Llame al [phone].    We comply with applicable federal civil rights " laws and [place] laws. We do not discriminate on the basis of race, color, national origin, age, disability, sex, sexual orientation, or gender identity.               Review of your medicines      START taking        Dose / Directions    acetaminophen 325 MG tablet   Commonly known as:  TYLENOL        Dose:  650 mg   Take 2 tablets (650 mg) by mouth every 4 hours as needed for mild pain   Quantity:  100 tablet   Refills:  0       colchicine 0.6 MG tablet   Commonly known as:  COLCYRS   Used for:  Pericarditis, unspecified chronicity, unspecified type        Dose:  0.6 mg   Take 1 tablet (0.6 mg) by mouth 2 times daily   Quantity:  180 tablet   Refills:  0       ibuprofen 800 MG tablet   Commonly known as:  ADVIL/MOTRIN   Used for:  Pericarditis, unspecified chronicity, unspecified type        Dose:  800 mg   Take 1 tablet (800 mg) by mouth 3 times daily   Quantity:  40 tablet   Refills:  0         CONTINUE these medicines which have NOT CHANGED        Dose / Directions    aspirin 81 MG chewable tablet        Dose:  81 mg   Take 81 mg by mouth   Refills:  0       diclofenac 1 % Gel topical gel   Commonly known as:  VOLTAREN        Apply 2-4 grams to area up to four times daily using enclosed dosing card.   Quantity:  100 g   Refills:  1       isosorbide mononitrate 30 MG 24 hr tablet   Commonly known as:  IMDUR        Dose:  15 mg   Take 15 mg by mouth   Refills:  0            Where to get your medicines      These medications were sent to [facility] [place], [place] - [address] [address]     Phone:  [phone]     colchicine 0.6 MG tablet    ibuprofen 800 MG tablet                Protect others around you: Learn how to safely use, store and throw away your medicines at www.disposemymeds.org.             Medication List: This is a list of all your medications and when to take them. Check marks below indicate your daily home schedule. Keep this list as  a reference.      Medications           Morning Afternoon Evening Bedtime As Needed    acetaminophen 325 MG tablet   Commonly known as:  TYLENOL   Take 2 tablets (650 mg) by mouth every 4 hours as needed for mild pain                                aspirin 81 MG chewable tablet   Take 81 mg by mouth   Last time this was given:  81 mg on 7/29/2018  8:06 AM                                colchicine 0.6 MG tablet   Commonly known as:  COLCYRS   Take 1 tablet (0.6 mg) by mouth 2 times daily   Last time this was given:  0.6 mg on 7/29/2018  8:06 AM                                diclofenac 1 % Gel topical gel   Commonly known as:  VOLTAREN   Apply 2-4 grams to area up to four times daily using enclosed dosing card.                                ibuprofen 800 MG tablet   Commonly known as:  ADVIL/MOTRIN   Take 1 tablet (800 mg) by mouth 3 times daily   Last time this was given:  600 mg on 7/29/2018  3:28 PM                                isosorbide mononitrate 30 MG 24 hr tablet   Commonly known as:  IMDUR   Take 15 mg by mouth   Last time this was given:  15 mg on 7/29/2018  8:06 AM

## 2018-07-28 NOTE — IP AVS SNAPSHOT
[address] [address]    Phone:  [phone]                                       After Visit Summary   7/28/2018    Lewis Cortes    MRN: [id number]           After Visit Summary Signature Page     I have received my discharge instructions, and my questions have been answered. I have discussed any challenges I see with this plan with the nurse or doctor.    ..........................................................................................................................................  Patient/Patient Representative Signature      ..........................................................................................................................................  Patient Representative Print Name and Relationship to Patient    ..................................................               ................................................  Date                                            Time    ..........................................................................................................................................  Reviewed by Signature/Title    ...................................................              ..............................................  Date                                                            Time

## 2018-07-29 VITALS
DIASTOLIC BLOOD PRESSURE: 54 MMHG | HEART RATE: 83 BPM | WEIGHT: 131.61 LBS | RESPIRATION RATE: 16 BRPM | OXYGEN SATURATION: 100 % | TEMPERATURE: 97.9 F | SYSTOLIC BLOOD PRESSURE: 97 MMHG

## 2018-07-29 PROBLEM — R07.9 CHEST PAIN: Status: ACTIVE | Noted: 2018-07-29

## 2018-07-29 LAB
ANION GAP SERPL CALCULATED.3IONS-SCNC: 7 MMOL/L (ref 3–14)
BUN SERPL-MCNC: 15 MG/DL (ref 7–30)
CALCIUM SERPL-MCNC: 8.6 MG/DL (ref 8.5–10.1)
CHLORIDE SERPL-SCNC: 108 MMOL/L (ref 94–109)
CO2 SERPL-SCNC: 27 MMOL/L (ref 20–32)
CREAT SERPL-MCNC: 0.89 MG/DL (ref 0.66–1.25)
ERYTHROCYTE [DISTWIDTH] IN BLOOD BY AUTOMATED COUNT: 13 % (ref 10–15)
GFR SERPL CREATININE-BSD FRML MDRD: 87 ML/MIN/1.7M2
GLUCOSE SERPL-MCNC: 110 MG/DL (ref 70–99)
HCT VFR BLD AUTO: 35.8 % (ref 40–53)
HGB BLD-MCNC: 11.5 G/DL (ref 13.3–17.7)
INTERPRETATION ECG - MUSE: NORMAL
MCH RBC QN AUTO: 28.4 PG (ref 26.5–33)
MCHC RBC AUTO-ENTMCNC: 32.1 G/DL (ref 31.5–36.5)
MCV RBC AUTO: 88 FL (ref 78–100)
PLATELET # BLD AUTO: 147 10E9/L (ref 150–450)
POTASSIUM SERPL-SCNC: 3.7 MMOL/L (ref 3.4–5.3)
RBC # BLD AUTO: 4.05 10E12/L (ref 4.4–5.9)
SODIUM SERPL-SCNC: 143 MMOL/L (ref 133–144)
TROPONIN I SERPL-MCNC: 0.15 UG/L (ref 0–0.04)
TROPONIN I SERPL-MCNC: 0.15 UG/L (ref 0–0.04)
WBC # BLD AUTO: 5 10E9/L (ref 4–11)

## 2018-07-29 PROCEDURE — 84484 ASSAY OF TROPONIN QUANT: CPT | Performed by: INTERNAL MEDICINE

## 2018-07-29 PROCEDURE — 25000132 ZZH RX MED GY IP 250 OP 250 PS 637: Performed by: STUDENT IN AN ORGANIZED HEALTH CARE EDUCATION/TRAINING PROGRAM

## 2018-07-29 PROCEDURE — 99236 HOSP IP/OBS SAME DATE HI 85: CPT | Performed by: INTERNAL MEDICINE

## 2018-07-29 PROCEDURE — G0378 HOSPITAL OBSERVATION PER HR: HCPCS

## 2018-07-29 PROCEDURE — 96375 TX/PRO/DX INJ NEW DRUG ADDON: CPT

## 2018-07-29 PROCEDURE — 25000125 ZZHC RX 250: Performed by: STUDENT IN AN ORGANIZED HEALTH CARE EDUCATION/TRAINING PROGRAM

## 2018-07-29 PROCEDURE — 99213 OFFICE O/P EST LOW 20 MIN: CPT | Mod: GC | Performed by: INTERNAL MEDICINE

## 2018-07-29 PROCEDURE — 84484 ASSAY OF TROPONIN QUANT: CPT | Performed by: STUDENT IN AN ORGANIZED HEALTH CARE EDUCATION/TRAINING PROGRAM

## 2018-07-29 PROCEDURE — 80048 BASIC METABOLIC PNL TOTAL CA: CPT | Performed by: INTERNAL MEDICINE

## 2018-07-29 PROCEDURE — 25000128 H RX IP 250 OP 636: Performed by: STUDENT IN AN ORGANIZED HEALTH CARE EDUCATION/TRAINING PROGRAM

## 2018-07-29 PROCEDURE — 40000893 ZZH STATISTIC PT IP EVAL DEFER

## 2018-07-29 PROCEDURE — 85027 COMPLETE CBC AUTOMATED: CPT | Performed by: INTERNAL MEDICINE

## 2018-07-29 RX ORDER — IBUPROFEN 800 MG/1
800 TABLET, FILM COATED ORAL 3 TIMES DAILY
Qty: 40 TABLET | Refills: 0 | Status: SHIPPED | OUTPATIENT
Start: 2018-07-29

## 2018-07-29 RX ORDER — COLCHICINE 0.6 MG/1
0.6 TABLET ORAL 2 TIMES DAILY
Qty: 180 TABLET | Refills: 0 | Status: SHIPPED | OUTPATIENT
Start: 2018-07-29 | End: 2018-10-27

## 2018-07-29 RX ORDER — ACETAMINOPHEN 650 MG/1
650 SUPPOSITORY RECTAL EVERY 4 HOURS PRN
Status: DISCONTINUED | OUTPATIENT
Start: 2018-07-29 | End: 2018-07-29 | Stop reason: HOSPADM

## 2018-07-29 RX ORDER — ONDANSETRON 2 MG/ML
4 INJECTION INTRAMUSCULAR; INTRAVENOUS EVERY 6 HOURS PRN
Status: DISCONTINUED | OUTPATIENT
Start: 2018-07-29 | End: 2018-07-29 | Stop reason: HOSPADM

## 2018-07-29 RX ORDER — NALOXONE HYDROCHLORIDE 0.4 MG/ML
.1-.4 INJECTION, SOLUTION INTRAMUSCULAR; INTRAVENOUS; SUBCUTANEOUS
Status: DISCONTINUED | OUTPATIENT
Start: 2018-07-29 | End: 2018-07-29 | Stop reason: HOSPADM

## 2018-07-29 RX ORDER — ACETAMINOPHEN 325 MG/1
650 TABLET ORAL EVERY 4 HOURS PRN
Status: DISCONTINUED | OUTPATIENT
Start: 2018-07-29 | End: 2018-07-29 | Stop reason: HOSPADM

## 2018-07-29 RX ORDER — ACETAMINOPHEN 325 MG/1
650 TABLET ORAL EVERY 4 HOURS PRN
Qty: 100 TABLET | COMMUNITY
Start: 2018-07-29

## 2018-07-29 RX ORDER — IBUPROFEN 600 MG/1
600 TABLET, FILM COATED ORAL EVERY 8 HOURS
Qty: 40 TABLET | Refills: 0 | Status: SHIPPED | OUTPATIENT
Start: 2018-07-30 | End: 2018-07-29

## 2018-07-29 RX ORDER — KETOROLAC TROMETHAMINE 30 MG/ML
30 INJECTION, SOLUTION INTRAMUSCULAR; INTRAVENOUS ONCE
Status: COMPLETED | OUTPATIENT
Start: 2018-07-29 | End: 2018-07-29

## 2018-07-29 RX ORDER — COLCHICINE 0.6 MG/1
0.6 TABLET ORAL DAILY
Status: DISCONTINUED | OUTPATIENT
Start: 2018-07-29 | End: 2018-07-29 | Stop reason: HOSPADM

## 2018-07-29 RX ORDER — GLIPIZIDE 10 MG/1
1-2 TABLET ORAL
Status: DISCONTINUED | OUTPATIENT
Start: 2018-07-29 | End: 2018-07-29 | Stop reason: HOSPADM

## 2018-07-29 RX ORDER — ONDANSETRON 4 MG/1
4 TABLET, ORALLY DISINTEGRATING ORAL EVERY 6 HOURS PRN
Status: DISCONTINUED | OUTPATIENT
Start: 2018-07-29 | End: 2018-07-29 | Stop reason: HOSPADM

## 2018-07-29 RX ORDER — IBUPROFEN 200 MG
600 TABLET ORAL
Status: DISCONTINUED | OUTPATIENT
Start: 2018-07-29 | End: 2018-07-29 | Stop reason: HOSPADM

## 2018-07-29 RX ORDER — ASPIRIN 81 MG/1
81 TABLET, CHEWABLE ORAL DAILY
Status: DISCONTINUED | OUTPATIENT
Start: 2018-07-29 | End: 2018-07-29 | Stop reason: HOSPADM

## 2018-07-29 RX ADMIN — ISOSORBIDE MONONITRATE 15 MG: 30 TABLET, EXTENDED RELEASE ORAL at 08:06

## 2018-07-29 RX ADMIN — COLCHICINE 0.6 MG: 0.6 TABLET, FILM COATED ORAL at 03:27

## 2018-07-29 RX ADMIN — KETOROLAC TROMETHAMINE 30 MG: 30 INJECTION, SOLUTION INTRAMUSCULAR at 04:52

## 2018-07-29 RX ADMIN — COLCHICINE 0.6 MG: 0.6 TABLET, FILM COATED ORAL at 08:06

## 2018-07-29 RX ADMIN — IBUPROFEN 600 MG: 600 TABLET ORAL at 08:06

## 2018-07-29 RX ADMIN — ONDANSETRON 4 MG: 4 TABLET, ORALLY DISINTEGRATING ORAL at 03:26

## 2018-07-29 RX ADMIN — ASPIRIN 81 MG CHEWABLE TABLET 81 MG: 81 TABLET CHEWABLE at 08:06

## 2018-07-29 RX ADMIN — IBUPROFEN 600 MG: 600 TABLET ORAL at 15:28

## 2018-07-29 ASSESSMENT — PAIN DESCRIPTION - DESCRIPTORS
DESCRIPTORS: DISCOMFORT;SHARP;RADIATING
DESCRIPTORS: DISCOMFORT
DESCRIPTORS: DISCOMFORT

## 2018-07-29 NOTE — PLAN OF CARE
DISCHARGE                         No discharge date for patient encounter.  ----------------------------------------------------------------------------  Discharged to: Home  Via: private transportation  Accompanied by: Family  Discharge Instructions: diet, activity, medications, follow up appointments, when to call the MD, aftercare instructions.  Prescriptions: To be filled by  pharmacy; medication list reviewed & sent with pt  Follow Up Appointments: arranged; information given  Belongings: All sent with pt  IV: d/c'd  Telemetry: d/c'd  Pt exhibits understanding of above discharge instructions; all questions answered.    Discharge Paperwork: Signed, copied, and sent home with patient.

## 2018-07-29 NOTE — ED PROVIDER NOTES
History     Chief Complaint   Patient presents with     Chest Pain     HPI  Lewis Cortes is a 57 year old male with history of coronary artery disease and prior MI in 2015, chronic idiopathic myocarditis, hypertrophic cardio myopathy, chronic/recurrent chest pain back pain, and Donald Fabry disease, COPD, pseudoseizure, somatization disorder, and drug-seeking behavior, who presents emergency department for evaluation of chest pain.  The patient reports approximately 45 minutes prior to arrival he developed a pain in the center of his chest which radiates straight through to his back and into the left shoulder blade region.  He states that the pain has persisted constantly, progressively worsening.  He describes character as a pressure sensation.  The patient has cardiac history noted above.  He does not have history of hypertension, hyperlipidemia, or diabetes.  The patient is seen very frequently across multiple healthcare systems of recurrent chest pain and back pain.  Most recently, he was seen at [facility] ER similarly for chest pain.  He had a mildly elevated troponin, though this is baseline for him, and the EKG was also benign.  This, in conjunction with the patient's noted multiple recent negative workups at other outside hospitals, patient was discharged with cardiology follow-up.    Current Facility-Administered Medications   Medication     sodium chloride 0.9% infusion     Current Outpatient Prescriptions   Medication     isosorbide mononitrate (IMDUR) 30 MG 24 hr tablet     aspirin 81 MG chewable tablet     diclofenac (VOLTAREN) 1 % GEL topical gel     Past Medical History:   Diagnosis Date     Myocardial infarction      Evaluation in this patient's chart reveals that this patient does have a past medical history of myocarditis COPD hypertension anxiety Donald-Fabry disease as well as a medical history of chronic elevated troponin I.     History reviewed. No pertinent surgical  history.    No family history on file.    Social History   Substance Use Topics     Smoking status: Never Smoker     Smokeless tobacco: Never Used     Alcohol use No     Allergies   Allergen Reactions     Nitroglycerin      Other reaction(s): Hypotension  Tolerated w/ improved acute chest pain 5/31/17     I have reviewed the Medications, Allergies, Past Medical and Surgical History, and Social History in the Epic system.    Review of Systems   Constitutional: Negative for fever.   Respiratory: Negative for shortness of breath.    Cardiovascular: Positive for chest pain.   Gastrointestinal: Negative for abdominal pain.   All other systems reviewed and are negative.      Physical Exam   BP: 96/85  Pulse: 83  Heart Rate: 58  Temp: 98  F (36.7  C)  Resp: 14  Weight: 59.7 kg (131 lb 9.8 oz)  SpO2: 98 %  ED Triage Vitals   Enc Vitals Group      BP 07/28/18 2005 96/85      Pulse 07/28/18 2005 83      Resp --       Temp 07/28/18 2000 98  F (36.7  C)      Temp src 07/28/18 2000 Oral      SpO2 07/28/18 2005 98 %      Weight 07/28/18 2000 59.7 kg (131 lb 9.8 oz)      Height --       Head Cir --       Peak Flow --       Pain Score --       Pain Loc --       Pain Edu? --       Excl. in GC? --        Physical Exam   Constitutional: He appears distressed (Moderate, secondary to pain).   HENT:   Head: Atraumatic.   Mouth/Throat: Oropharynx is clear and moist. No oropharyngeal exudate.   Eyes: Pupils are equal, round, and reactive to light. No scleral icterus.   Cardiovascular: Normal heart sounds and intact distal pulses.    Pulmonary/Chest: Breath sounds normal. No respiratory distress.   Abdominal: Soft. Bowel sounds are normal. There is no tenderness.   Musculoskeletal: He exhibits no edema or tenderness.   Skin: Skin is warm. No rash noted. He is not diaphoretic.       ED Course     ED Course   She presented to emergency department in moderate distress secondary to chest pain he describes a chest pressure that is going to her  left arm.  Initial EKG done this patient did not show any change in with a previous EKG done this year.. He does have a bundle branch block morphology.  Plan to treat this chest pain will evaluate for PE and suspect that will need admission for chest pain rule out.     At this point patient doing well, x-ray shows COPD morphology but no evidence of pneumothoraces there is normal mediastinum and there are no infiltrates in the lung parenchyma.  Labs are within normal limits troponin still pending, CT angiogram pending as well.    Addendum at 2350 hours; patient has an elevated troponin at 0 point 0.139 he still having mild pain although atypical I will repeat the morphine, HR is in the 50s so we will not give any beta blockade.  Patient was already given aspirin morphine oxygen, will consult [facility] to see if they want me to give him any anticoagulation.  Patient is allergic to nitroglycerin so that will be held as well    Assessment reevaluation note at the 2359 hours.  I have spoken with fellow on call for [facility], although does not feel that this is due to a acute coronary syndrome so he is recommended the patient be admitted to medicine for serial enzymes and that if there is any significant abnormalities there will be glad to consult.  The patient is stable in no acute distress without any significant changes.    Dr. Cook accepted the patient to the service    Procedures             EKG Interpretation:      Interpreted by Shaan Valdovinos MD  Time reviewed: 2002  Symptoms at time of EKG: chest pain   Rhythm: normal sinus   Rate: 80  Axis: normal  Ectopy: none  Conduction: incomplete RBBB  ST Segments/ T Waves: No acute ischemic changes  Q Waves: none  Comparison to prior: No acute changes compared to EKG from 6/6/18.    Clinical Impression: normal sinus, no evident acute changes          Critical Care time:  none             Labs Ordered and Resulted from Time of ED Arrival Up to the Time of Departure from the ED   CBC  WITH PLATELETS DIFFERENTIAL - Abnormal; Notable for the following:        Result Value    Hemoglobin 12.7 (*)     Hematocrit 39.2 (*)     All other components within normal limits   COMPREHENSIVE METABOLIC PANEL - Abnormal; Notable for the following:     Glucose 110 (*)     All other components within normal limits   INR   LIPASE   TROPONIN I   PERIPHERAL IV CATHETER            Assessments & Plan (with Medical Decision Making)   This is a 57-year-old male with a past medical history of COPD anxiety myocarditis Donald Fabry disease was a chronic history of elevated troponin I that comes into emergency department complaint of chest pain.. Mentation is worrisome for cardiac chest pain due to the fact that he was concerned about chest pressure that was radiating to the jaw as well as the left arm.  This patient has a history of a chronic elevated troponin I patient will require admission for chest pain rule out.        I have reviewed the nursing notes.    I have reviewed the findings, diagnosis, plan and need for follow up with the patient.    New Prescriptions    No medications on file       Final diagnoses:   Other chest pain   Other emphysema (H)   Donald-Fabry disease (H)   Myocarditis, unspecified chronicity, unspecified myocarditis type (H)   Right bundle branch block   Elevated troponin I level - 0.139     I, Josse Cook, am serving as a trained medical scribe to document services personally performed by Shaan Valdovinos MD, based on the provider's statements to me.      IShaan MD, was physically present and have reviewed and verified the accuracy of this note documented by Josse Cook.    7/28/2018   [facility], EMERGENCY DEPARTMENT     Shaan Valdovinos MD  07/29/18 0114

## 2018-07-29 NOTE — PROGRESS NOTES
Patient's observation goals:   -diagnostic tests and consults completed and resulted  -vital signs normal or at patient baseline  -sufficient workup for chest pain completed and Cardiology has evaluated   Nurse to notify provider when observation goals have been met and patient is ready for discharge     Patient has not met observation goals at this time:  Cardiology has not seen patient, patient's VS at not currently back at baseline

## 2018-07-29 NOTE — ED NOTES
Pt complaining of acute onset left arm numbness/tingling that he states started approximately 20 minutes ago. No other symptoms noted/observed. Pagemine Sellers MD.

## 2018-07-29 NOTE — PROGRESS NOTES
[facility], [place]  [address] [facility]  [address] [place] [id number]  [phone]      7/29/2018    Re: Lewis Cortes      TO WHOM IT MAY CONCERN:    Lewis Cortes  was admitted at our hospital on 7/28/2018. He is being discharged today 7/29/2018    Mr Cortes can resume his work as tolerated. No specific restriction required.     Please feel free to contact me should you have any further question.       Thank you.          Leighton Scott MD  Hospitalist.   Department of Internal Medicine.

## 2018-07-29 NOTE — ED TRIAGE NOTES
Pt arrived for chest pain that radiates into his left back and shoulder. It started 45 minutes ago approximately. He says he has had a history of a myocardial infarction. He has been seen at [facility] and [facility]. He denies taking any other drugs besides aspirin daily. He is supposed to be taking IMDUR but does not currently

## 2018-07-29 NOTE — DISCHARGE SUMMARY
Discharge Summary    Lewis Cortes MRN# [id number]   YOB: 1960 Age: 57 year old     Date of Admission:  7/28/2018  Date of Discharge:  7/29/2018  5:36 PM  Admitting Physician:  Jackson Cook MD  Discharge Physician:  Leighton Scott MD   Discharging Service:  Internal Medicine     Primary Provider: Clinic[facility]           Discharge Diagnosis:      Chest Pain.   Suspect Pericarditis Vs Microvascular Angina.   H/O Donald fabry disease.              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 Chest pain: unchanged. Cardiology consulted.   Monitored on tele- no event.     Per Cardiology (dw Cardiology):   Patient's troponin elevation is relatively flat (0.139 to 0.153 to 0.147), which is atypical for an acute injury pattern (plaque rupture/erosion suggestive of ACS). In addition, his most recent angiogram was in 02/2018 and showed no obstructive CAD.      Based on history, this could be pericarditis vs. Microvascular angina. Given the history of positional chest pain, would opt to treat for pericarditis. However, given that patient has Donald-Fabry disease (association with microvascular disease), he also may benefit from re-starting his  anginal medications (however, will not do this at this time because he did not tolerate medications in the past).         RECOMMEND:  #Chest Pain   -As above, DDx includes pericarditis vs. Microvascular angina. ACS is not as likely given history and flat troponin elevation.   -Will treat for pericarditis with Ibuprofen 600-800 mg TID for 1-2 weeks and Colchine 0.6 mg BID for 3 months.   -If this does not improve the pain, would then treat for microvascular disease by starting metoprolol and amlodipine. Patient does not want to take nitrates again.      #Donald-Fabry Disease   -Diagnosis based on Cardiac MRI and Enzyme Level.  -Patient can continue to follow with [facility] Cardiologist; however, will refer him to Dr. Rosen for a second opinion for treatment.     # Discharge Pain Plan:   Pain controlled at time of discharge.     *Patient later on the day asked for discharge.    Cardiology okay with the patient leaving.             Discharge Day Exam:   See exam under HPI                Significant Results Obtained During Hospitalization:   All relevant data  Reviewed.      Lab Results   Component Value Date    WBC 5.0 07/29/2018    HGB 11.5 (L) 07/29/2018    HCT 35.8 (L) 07/29/2018     (L) 07/29/2018     07/29/2018    POTASSIUM 3.7 07/29/2018    CHLORIDE 108 07/29/2018    CO2 27 07/29/2018    BUN 15 07/29/2018    CR 0.89 07/29/2018     (H) 07/29/2018    DD 0.4 06/06/2018    NTBNPI 731 06/06/2018    TROPONIN 0.07 06/06/2018    TROPI 0.147 (HH) 07/29/2018    AST 32 07/28/2018    ALT 20 07/28/2018    ALKPHOS 62 07/28/2018    BILITOTAL 0.6 07/28/2018    INR 1.01 07/28/2018      Recent Results (from the past 48 hour(s))   Chest  XR, 1 view portable    Narrative    Exam: XR CHEST PORT 1 VW, 7/28/2018 8:42 PM    Indication: CP;     Comparison: 6/6/2018    Findings:   Emphysematous changes at the apices. Lungs are clear. Heart within  normal limits.      Impression    Impression: Emphysematous changes, no  acute airspace disease  identified.    STUART VINES MD   Chest CT, IV contrast only - PE protocol    Narrative    EXAMINATION: CT CHEST PULMONARY EMBOLISM W CONTRAST, 7/28/2018 10:00  PM     COMPARISON: None.    HISTORY: CP, Eval for PE;     TECHNIQUE: Volumetric helical acquisition of CT images of the chest  from the lung apices to the kidneys were acquired after the  administration of 52 mL of Isovue-370 IV contrast. Three-dimensional  (3D) post-processed angiographic images were reconstructed, archived  to PACS and used in interpretation of this study.    Diagnostic quality: Excellent    FINDINGS:     Pulmonary embolism: None.   Right heart strain: None.   Pulmonary arteries: Normal in caliber.     Lung parenchyma: Extensive bilateral centrilobular and paraseptal  emphysema. Large bilateral apical bullae. Mild bibasilar  fibroatelectasis. No suspicious pulmonary nodule.  Pleural effusion: None.   Central airways: Normal.     Adenopathy: None.   Heart and great vessels: Normal heart size. Mild atherosclerotic  changes of the aortic arch.     Upper abdomen: Unremarkable.   Bones: No aggressive osseous lesion. Degenerative changes of the lower  thoracic and lumbar spine. Vertebral body hemangioma of T12.       Impression    IMPRESSION:   1. No pulmonary embolism demonstrated.  2. Extensive and severe centrilobular and paraseptal emphysema. Large  bilateral apical bullae.    I have personally reviewed the examination and initial interpretation  and I agree with the findings.    CARMELA AYALA MD                     Pending Results:     Unresulted Labs Ordered in the Past 30 Days of this Admission     No orders found for last 61 day(s).               Condition on Discharge:   Discharge condition: Stable   Discharge vitals: Blood pressure 97/54, pulse 83, temperature 97.9  F (36.6  C), temperature source Oral, resp. rate 16, weight 59.7 kg (131 lb 9.8 oz), SpO2 100 %.                  Discharge Medications:      Current Discharge Medication List      START taking these medications    Details   acetaminophen (TYLENOL) 325 MG tablet Take 2 tablets (650 mg) by mouth every 4 hours as needed for mild pain  Qty: 100 tablet      colchicine (COLCYRS) 0.6 MG tablet Take 1 tablet (0.6 mg) by mouth 2 times daily  Qty: 180 tablet, Refills: 0    Associated Diagnoses: Pericarditis, unspecified chronicity, unspecified type      ibuprofen (ADVIL/MOTRIN) 800 MG tablet Take 1 tablet (800 mg) by mouth 3 times daily  Qty: 40 tablet, Refills: 0    Comments: Take 800 every 8 hours for 1 week then as needed.  Associated Diagnoses: Pericarditis, unspecified chronicity, unspecified type         CONTINUE these medications which have NOT CHANGED    Details   isosorbide mononitrate (IMDUR) 30 MG 24 hr tablet Take 15 mg by mouth      aspirin 81 MG chewable tablet Take 81 mg by mouth      diclofenac (VOLTAREN) 1 % GEL topical gel Apply 2-4 grams to area up to four times daily using enclosed dosing card.  Qty: 100 g, Refills: 1                    Discharge Disposition:   Discharged to home             Discharge Instructions:     Discharge Procedure Orders  Activity   Order Comments: Your activity upon discharge: activity as tolerated   Order Specific Question Answer Comments   Is discharge order? Yes      Reason for your hospital stay   Order Comments: Chest Pain suspect Pericarditis. Seen by Cardiology. Recommended Ibuprofen 800 3 times daily x 1 week and Colchicine 0.6 mg twice daily x 3 months.   Follow up with Cardiology as outpatient.     Adult [facility] Follow-up and recommended labs and tests   Order Comments: Follow up with Cardiology team  (Dr Angeline Rosen) . Preferably within 2 weeks.     Follow up with your primary care in 1 week or as needed.     Appointments on [place] and/or [facility] (with [facility] or [facility] provider or service). Call [phone] if you haven't heard regarding these appointments within 7 days of discharge.     Diet    Order Comments: Follow this diet upon discharge: resume prior to admission diet.   Order Specific Question Answer Comments   Is discharge order? Yes             Thank you for the opportunity to participate in the care of your patient.  Please do not hesitate to contact our service with questions or concerns.    Total time spent was greater than 30 minutes; Greater than 50% of the time was in counseling and care coordination. Care coordination included discussion of medication changes, lifestyle changes and reviewing instructions to the patient.     D/W RN  D/w Cardiology       Leighton Scott MD   Hospitalist (Internal Medicine)  Pager: [phone].     DOS : 07.29.18

## 2018-07-29 NOTE — SUMMARY OF CARE
Admission          7/28/2018  8:00 PM  -----------------------------------------------------------  Reason for admission:  Primary team notified of pt arrival.  Admitted from: ED  Via: stretcher  Accompanied by: family  Belongings: Placed in closet  Admission Profile: complete  Teaching: orientation to unit and call light- call light within reach, call don't fall, use of console, meal times, when to call for the RN, and enforced importance of safety   Access: PIV  Telemetry:Placed on pt  Ht./Wt.: complete  2 RN Skin Assessment Completed By: Graciela Jolly RN   Pt status:    Temp:  [97.5  F (36.4  C)-98  F (36.7  C)] 97.5  F (36.4  C)  Pulse:  [83] 83  Heart Rate:  [46-70] 61  Resp:  [8-26] 16  BP: ()/(33-85) 101/53  SpO2:  [90 %-100 %] 91 %

## 2018-07-29 NOTE — H&P
[facility], [place]    Internal Medicine History and Physical - [facility] Service       Date of Admission:  7/28/2018    Chief Complaint   Chest pain    History is obtained from the patient and medical record.    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     The pain is chronic by acutely worsened today. It was sub-sternal and radiated to his L shoulder and neck, which is new. The pain also radiated through his back to his L shoulder blade. It usually is intermittent with electric sensations in the L anterior lower thorax until today. Reports it was 11/10 but now is a 6/10 after treatment with some morphine in the ED.    He reports associated lightheadedness w/o recent fainting and mild nausea w/o vomiting. Feels like the breath has been taken out of him, but not overtly dyspneic/short of breath. Endorses 25 lbs weight loss in recent hx (thought weights stable compared to 1 yr ago). No epistaxis, hematemesis, hematuria, hematochezia, or melena. No recent illnesses, fevers, night sweats, cough, dysuria, changes in urine character, or LAD. Has a mole-like lesion on back that is enlarging, but does not endorse other skin lesions/erythema.     In the ED, pt found to have elevated troponin to 0.139. CT-PE completed without evidence of PE. Pt does have severe emphysematous changes w/ large b/l apical bullae. ED discussed with Cardiology, who recommended NSAIDs and colchicine with medicine team to treat pericarditis. He is being admitted to observation to trend troponin    Of note, pt has hx of chest pain for past several years; evaluated at [facility] and multiple hospitals. Last followed-up with Cardiology through [facility] 5/2018. Has had  multiple angiograms showing mild atherosclerosis but no obstructing lesions. Stress echo without ischemia. Bilateral ventricular thickening w/o significant impairment of systolic function (LVEF 51%, RVEF 44%). Dilated aortic root at 4.4cm. Has taken ranolazine, CCB, and long-acting nitrate but discontinued these as he felt they were not beneficial. Hx of L ventricular hypertrophy w/o hx of HTN/AoV disease. Hx of pericardial effusion, tamponade, and pericardiocentesis x1. Has elevation in TnI but not Audra. Cardiac MRI revealed some delayed enhancement, and pt was suspected to have Donald-Fabry disease which seems likely after rare polymorphism found in conserved region of protein. MRI did not show evidence of pericarditis with tamponade.        Review of Systems   The 10 point Review of Systems is negative other than noted in the HPI or here.     Past Medical History    I have reviewed this patient's medical history and updated it with pertinent information if needed.   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     Emphysema of lung (H)      Myocardial infarction      Pericarditis       Past Surgical History   I have reviewed this patient's surgical history and updated it with pertinent information if needed.  Past Surgical History:   Procedure Laterality Date     APPENDECTOMY       COLONOSCOPY  03/2018     HEART CATH FYI       wisdom teeth          Social History   Social History   Substance Use Topics     Smoking status: Never Smoker     Smokeless tobacco: Never Used     Alcohol use No   No current use of street narcotic medications. Presently lives with platonic female roommate in a house with his cat. Works as an auto-technician.     Family History   I have reviewed this patient's family history and updated it with pertinent information if needed.    Family History   Problem Relation Age of Onset     Substance Abuse Brother      Alcoholism Brother      Emphysema Brother        Prior to Admission Medications   Prior to Admission Medications   Prescriptions Last Dose Informant Patient Reported? Taking?   aspirin 81 MG chewable tablet   Yes No   Sig: Take 81 mg by mouth   diclofenac (VOLTAREN) 1 % GEL topical gel   No No   Sig: Apply 2-4 grams to area up to four times daily using enclosed dosing card.   isosorbide mononitrate (IMDUR) 30 MG 24 hr tablet   Yes Yes   Sig: Take 15 mg by mouth      Facility-Administered Medications: None     Allergies   Allergies   Allergen Reactions     Nitroglycerin      Other reaction(s): Hypotension  Tolerated w/ improved acute chest pain 5/31/17       Physical Exam   Vital Signs: Temp: 98  F (36.7  C) Temp src: Oral BP: 105/70 Pulse: 83 Heart Rate: 53 Resp: 13 SpO2: 98 % O2 Device: None (Room air)    Weight: 131 lbs 9.83 oz    General Appearance: pt seen lying in ED stretcher; does not appear in acute distress   Eyes: no conjunctival injection nor icterus; conjugate gaze  HEENT: sub-optimal dentition; no oropharygneal bleedings, petechiae, lesions, or exudates   Respiratory: breath sounds symmetric to bases; no wheezes, crackles, or rhonchi  Cardiovascular: normal S1/S2 with regular rate and rhythm; no S3/S4, murmurs, clicks, or friction rubs   GI: soft, non-distended w/ normoactive bowel sounds; tender to palpation in LUQ and epigastrium w/ more mild tenderness suprapubically; some guarding; no rebound tenderness  Genitourinary: no pham in place  Skin: +2cm brown macular lesion on R back with darker black speckling centrally; smooth borders; no verrucous appearance, scaling, or bleeding; old abdominal scars;  no other lesions on neck, face, trunk, or extremities  Musculoskeletal: no tenderness to palpation of calves; no pitting edema  Neurologic: alert and oriented; converses appropriately; able to move all extremities  spontaneously; sensation to light touch intact and symmetric in face, upper extremities, and lower extremities; strength +5/5 in b/l lumbricals, interossei, biceps, triceps, deltoids, iliopsoas, dorsiflexors, and plantarflexors   Psychiatric: euthymic mood with appropriate affect     Assessment & Plan   Lewis Cortes is a 57 year old man w/ PMHx of CAD and prior MI in 2015, chronic idiopathic myocarditis/pericarditis, biventricular hypertrophy, Donald-Fabry disease, COPD, chronic/recurrent chest/back pain, and 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     #Chest Pain  #Troponinemia  #Hx CAD and prior MI  #Hx myocarditis/pericarditis  #Hx of presumed microvascular cardiac disease  Pericarditis vs ACS vs demand ischemia vs sequelae of Donald-Fabry disease. [facility] through [facility] found rare polymorphism consistent w/ Fabry's. Unclear how that may be contributing to pain and troponin elevation. Other concerns include ACS or pericarditis given hx. Stable ECGs and mild Tn elevation which is likely chronic. No PE on CT and no obvious aortic dissection. Cardiology discussed and felt pain related to pericarditis. Tn still mildly up-trending in ED.  - continue to trend troponin until down-trending  - Cardiology consulted to follow-up in the AM  - ketorolac 30mg x1 given  - ibuprofen 600mg q8 hrs and colchicine 0.6mg BID  - follow-up with outpatient Cardiology as previously scheduled  - continue isosorbide mononitrate 15mg daily   - continue ASA 81mg  - hold atorvastatin 40mg daily and metoprolol; unclear if pt taking this    #Donald-Fabry disease  B/l ventricular thickening seen on cardiac MRI and alpha-galactosidase low, consistent with disease. Genetic test showed uncommon polymorphism in P362L, which is an evolutionarily conserved amino acid previously reported in another patient with Donald-Fabry disease.   - fabrizyme 1mg/kg q2  weeks    #COPD/emphysema  Observed emphysematous changes on CT. Not on any controlling meds or feeling overly short of breath.   - consider outpatient follow-up    #Reported pseudoseizure/somatization disorder   - continue to monitor     # Pain Assessment:   - Lewis is experiencing pain due to chest discomfort of unclear etiology. Pain management was discussed and the plan was created in a collaborative fashion. Lewis's response to the current recommendations: compliant  - Please see the plan for pain management as documented above  - pt responded well to ketorolac  - aware he will be receiving ibuprofen and colchicine     Diet: Combination Diet Low Saturated Fat Na <2400mg Diet, No Caffeine Diet  Fluids: ED maintenance fluids d/c'd   DVT Prophylaxis: Ambulate every shift; add enoxaparin if staying >1 day  Code Status: Full Code    Disposition Plan   Expected discharge: Today; recommended to prior living arrangement once adequate pain management/ tolerating PO medications and chest pain has sufficienctly been worked up with nafarious causes ruled out.     Entered: Jama Andrew 07/29/2018, 6:31 AM   Information in the above section will display in the discharge planner report.    The patient will be discussed w/ Dr. Luciano in the AM.    Jama Andrew MD  [facility]   Pager: [phone]  Please see sticky note for cross cover information      Data   - Cr stable and within baseline  - Tn 0.139 --> 0.153  - mild anemia  - CT-PE w/o PE or evidence of ascending aortic dissection

## 2018-07-29 NOTE — PLAN OF CARE
Problem: Patient Care Overview  Goal: Plan of Care/Patient Progress Review  PT 6B: Will sign off. Per conversation with RN and chart review, no acute PT needs identified. Pt likely indep with mobility and wouldn't require skilled PT this admission. Safe to discharge home once medically stable. Orders completed.

## 2018-07-30 ENCOUNTER — TELEPHONE (OUTPATIENT)
Dept: CARDIOLOGY | Facility: CLINIC | Age: 58
End: 2018-07-30

## 2018-07-30 ENCOUNTER — CARE COORDINATION (OUTPATIENT)
Dept: CARE COORDINATION | Facility: CLINIC | Age: 58
End: 2018-07-30

## 2018-07-30 NOTE — CONSULTS
Cardiology Consult         Date of Service (when I saw the patient): 07/29/18    ASSESSMENT:   Lewis Cortes is a 57 year old male with a PMHx of chronic chest pain, Donald-Fabry disease, pericarditis c/b pericardial effusion and tamponade,  COPD who presents with chest pain and troponin elevation. Patient's troponin elevation is relatively flat (0.139 to 0.153 to 0.147), which is atypical for an acute injury pattern (plaque rupture/erosion suggestive of ACS). In addition, his most recent angiogram was in 02/2018 and showed no obstructive CAD.     Based on history, this could be pericarditis vs. Microvascular angina. Given the history of positional chest pain, would opt to treat for pericarditis. However, given that patient has Donald-Fabry disease (association with microvascular disease), he also may benefit from re-starting his anginal medications (however, will not do this at this time because he did not tolerate medications in the past).       RECOMMEND:  #Chest Pain   -As above, DDx includes pericarditis vs. Microvascular angina. ACS is not as likely given history and flat troponin elevation.   -Will treat for pericarditis with Ibuprofen 600-800 mg TID for 1-2 weeks and Colchine 0.6 mg BID for 3 months.   -If this does not improve the pain, would then treat for microvascular disease by starting metoprolol and amlodipine. Patient does not want to take nitrates again.     #Donald-Fabry Disease   -Diagnosis based on Cardiac MRI and Enzyme Level.  -Patient can continue to follow with [facility]; however, will refer him to Dr. Rosen for a second opinion for treatment.     Juhi Slade PGY-5  Cardiology Fellow   Pager: [phone]        Juhi Slade    REASON FOR CONSULT: Chest Pain     History of Present Illness   Lewis Cortes is a 57 year old male with a PMHx of chronic chest pain, Donald-Fabry disease, pericarditis c/b pericardial effusion and tamponade,  COPD who presents with chest pain.  Patient states he has been dealing with chest pain for multiple years and has had multiple angiograms which have been negative for obstructive epicardial disease. He describes he current chest pain as substernal with radiation to shoulder and back. The pain is worse with inspiration and laying on his back. He does report improvement when he leans forward. Patient has been diagnosed with pericarditis; however, he states the pain is somewhat different. When patient came to the ED, his troponin was elevated at 0.139. He had a CT PE which was negative. Patient was started on medications for pericarditis. On my examination, patient still has some chest pain as described as above.     Patient does not follow cardiology here at the [facility]. At an [facility], he has gone through extensive testing. He underwent Cardiac MRI which showed hypertrophy with a LGE pattern consistent with Donald-Fabry disease. His enzyme level was low and was being considered to start enzyme replacement therapy. However, patient is currently not on therapy. Also, it is noted that he has a chronic elevation of troponin I; however, his troponin T was negative. Of note, he was started on aggressive anginal medications for microvascular disease; however, he stopped the medications due to side effects.     Past Medical History    I have reviewed this patient's medical history and updated it with pertinent information if needed.   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     Emphysema of lung (H)      Myocardial infarction      Pericarditis        Past Surgical History   I have reviewed this patient's surgical history and updated it with pertinent information if needed.  Past Surgical History:   Procedure Laterality Date     APPENDECTOMY       COLONOSCOPY  03/2018     HEART CATH FÁTIMA ryan  teeth         Prior to Admission Medications   Prior to Admission Medications   Prescriptions Last Dose Informant Patient Reported? Taking?   acetaminophen (TYLENOL) 325 MG tablet   Yes Yes   Sig: Take 2 tablets (650 mg) by mouth every 4 hours as needed for mild pain   aspirin 81 MG chewable tablet   Yes No   Sig: Take 81 mg by mouth   diclofenac (VOLTAREN) 1 % GEL topical gel   No No   Sig: Apply 2-4 grams to area up to four times daily using enclosed dosing card.   isosorbide mononitrate (IMDUR) 30 MG 24 hr tablet   Yes Yes   Sig: Take 15 mg by mouth      Facility-Administered Medications: None     Allergies   Allergies   Allergen Reactions     Nitroglycerin      Pt states his pressures drop rapidly. Denies anaphylaxis.   Other reaction(s): Hypotension  Tolerated w/ improved acute chest pain 5/31/17       Social History   I have reviewed this patient's social history and updated it with pertinent information if needed. Lewis Cortes  reports that he has never smoked. He has never used smokeless tobacco. He reports that he does not drink alcohol or use illicit drugs.    Family History   I have reviewed this patient's family history and updated it with pertinent information if needed.   Family History   Problem Relation Age of Onset     Substance Abuse Brother      Alcoholism Brother      Emphysema Brother        Review of Systems   The 10 point Review of Systems is negative other than noted in the HPI or here.     Physical Exam   Temp: 97.9  F (36.6  C) Temp src: Oral BP: 97/54 Pulse: 83 Heart Rate: 53 Resp: 16 SpO2: 100 % O2 Device: Nasal cannula Oxygen Delivery: 1 LPM  Vital Signs with Ranges  Temp:  [97.5  F (36.4  C)-98  F (36.7  C)] 97.9  F (36.6  C)  Pulse:  [83] 83  Heart Rate:  [46-70] 53  Resp:  [8-26] 16  BP: ()/(33-85) 97/54  SpO2:  [90 %-100 %] 100 %  131 lbs 9.83 oz    General: No acute distress   HEENT: NC/AT   CV: RRR, +S1/S2, No murmurs, rubs, gallops.   Pulm: Unlabored breathing, CTAB    GI: s/nt/nd   Ext: No edema   Neuro: No focal defects   Psych: Normal Affect      Data   Data reviewed today:  I personally reviewed the EKG tracing showing LVH with repolarization abnormality .    Recent Labs  Lab 07/29/18  0627 07/29/18  0320 07/28/18 2033   WBC 5.0  --  6.0   HGB 11.5*  --  12.7*   MCV 88  --  88   *  --  150   INR  --   --  1.01     --  142   POTASSIUM 3.7  --  3.7   CHLORIDE 108  --  105   CO2 27  --  28   BUN 15  --  17   CR 0.89  --  0.99   ANIONGAP 7  --  8   JEAN 8.6  --  9.8   *  --  110*   ALBUMIN  --   --  3.9   PROTTOTAL  --   --  6.9   BILITOTAL  --   --  0.6   ALKPHOS  --   --  62   ALT  --   --  20   AST  --   --  32   LIPASE  --   --  106   TROPI 0.147* 0.153* 0.139*       Recent Results (from the past 24 hour(s))   Chest  XR, 1 view portable    Narrative    Exam: XR CHEST PORT 1 VW, 7/28/2018 8:42 PM    Indication: CP;     Comparison: 6/6/2018    Findings:   Emphysematous changes at the apices. Lungs are clear. Heart within  normal limits.      Impression    Impression: Emphysematous changes, no acute airspace disease  identified.    STUART VINES MD   Chest CT, IV contrast only - PE protocol    Narrative    EXAMINATION: CT CHEST PULMONARY EMBOLISM W CONTRAST, 7/28/2018 10:00  PM     COMPARISON: None.    HISTORY: CP, Eval for PE;     TECHNIQUE: Volumetric helical acquisition of CT images of the chest  from the lung apices to the kidneys were acquired after the  administration of 52 mL of Isovue-370 IV contrast. Three-dimensional  (3D) post-processed angiographic images were reconstructed, archived  to PACS and used in interpretation of this study.    Diagnostic quality: Excellent    FINDINGS:     Pulmonary embolism: None.   Right heart strain: None.   Pulmonary arteries: Normal in caliber.     Lung parenchyma: Extensive bilateral centrilobular and paraseptal  emphysema. Large bilateral apical bullae. Mild bibasilar  fibroatelectasis. No suspicious pulmonary  nodule.  Pleural effusion: None.   Central airways: Normal.     Adenopathy: None.   Heart and great vessels: Normal heart size. Mild atherosclerotic  changes of the aortic arch.     Upper abdomen: Unremarkable.   Bones: No aggressive osseous lesion. Degenerative changes of the lower  thoracic and lumbar spine. Vertebral body hemangioma of T12.       Impression    IMPRESSION:   1. No pulmonary embolism demonstrated.  2. Extensive and severe centrilobular and paraseptal emphysema. Large  bilateral apical bullae.    I have personally reviewed the examination and initial interpretation  and I agree with the findings.    CARMELA AYALA MD       Thank you for allowing me to care for this patient. This has been discussed with the attending physician.    Juhi Slade PGY-5  Cardiology Fellow   Pager: [phone]

## 2018-08-07 NOTE — TELEPHONE ENCOUNTER
Called patient to schedule appointment with Dr. Rosen. No answer, left voicemail.   Elian Aquino, [facility] Heart Failure Admin/Referrals

## 2018-08-08 ENCOUNTER — TELEPHONE (OUTPATIENT)
Dept: CARDIOLOGY | Facility: CLINIC | Age: 58
End: 2018-08-08

## 2018-08-14 ENCOUNTER — TELEPHONE (OUTPATIENT)
Dept: CARDIOLOGY | Facility: CLINIC | Age: 58
End: 2018-08-14

## 2025-08-26 ENCOUNTER — TELEPHONE (OUTPATIENT)
Dept: CONSULT | Facility: CLINIC | Age: 65
End: 2025-08-26
Payer: COMMERCIAL